# Patient Record
Sex: MALE | Race: OTHER | HISPANIC OR LATINO | URBAN - METROPOLITAN AREA
[De-identification: names, ages, dates, MRNs, and addresses within clinical notes are randomized per-mention and may not be internally consistent; named-entity substitution may affect disease eponyms.]

---

## 2022-03-05 ENCOUNTER — EMERGENCY (EMERGENCY)
Facility: HOSPITAL | Age: 52
LOS: 1 days | Discharge: HOME | End: 2022-03-05
Attending: EMERGENCY MEDICINE
Payer: COMMERCIAL

## 2022-03-05 ENCOUNTER — TRANSCRIPTION ENCOUNTER (OUTPATIENT)
Age: 52
End: 2022-03-05

## 2022-03-05 VITALS — HEIGHT: 70 IN | WEIGHT: 220.02 LBS

## 2022-03-05 DIAGNOSIS — R07.9 CHEST PAIN, UNSPECIFIED: ICD-10-CM

## 2022-03-05 DIAGNOSIS — E78.5 HYPERLIPIDEMIA, UNSPECIFIED: ICD-10-CM

## 2022-03-05 LAB
ALBUMIN SERPL ELPH-MCNC: 4.2 G/DL — SIGNIFICANT CHANGE UP (ref 3.3–5.2)
ALP SERPL-CCNC: 49 U/L — SIGNIFICANT CHANGE UP (ref 40–120)
ALT FLD-CCNC: 31 U/L — SIGNIFICANT CHANGE UP
ANION GAP SERPL CALC-SCNC: 10 MMOL/L — SIGNIFICANT CHANGE UP (ref 5–17)
APTT BLD: 30.1 SEC — SIGNIFICANT CHANGE UP (ref 27.5–35.5)
AST SERPL-CCNC: 22 U/L — SIGNIFICANT CHANGE UP
BASOPHILS # BLD AUTO: 0.04 K/UL — SIGNIFICANT CHANGE UP (ref 0–0.2)
BASOPHILS NFR BLD AUTO: 0.7 % — SIGNIFICANT CHANGE UP (ref 0–2)
BILIRUB SERPL-MCNC: 0.6 MG/DL — SIGNIFICANT CHANGE UP (ref 0.4–2)
BUN SERPL-MCNC: 14.8 MG/DL — SIGNIFICANT CHANGE UP (ref 8–20)
CALCIUM SERPL-MCNC: 8.9 MG/DL — SIGNIFICANT CHANGE UP (ref 8.6–10.2)
CHLORIDE SERPL-SCNC: 103 MMOL/L — SIGNIFICANT CHANGE UP (ref 98–107)
CO2 SERPL-SCNC: 26 MMOL/L — SIGNIFICANT CHANGE UP (ref 22–29)
CREAT SERPL-MCNC: 0.93 MG/DL — SIGNIFICANT CHANGE UP (ref 0.5–1.3)
D DIMER BLD IA.RAPID-MCNC: <150 NG/ML DDU — SIGNIFICANT CHANGE UP
EGFR: 99 ML/MIN/1.73M2 — SIGNIFICANT CHANGE UP
EOSINOPHIL # BLD AUTO: 0.18 K/UL — SIGNIFICANT CHANGE UP (ref 0–0.5)
EOSINOPHIL NFR BLD AUTO: 3.1 % — SIGNIFICANT CHANGE UP (ref 0–6)
GLUCOSE SERPL-MCNC: 150 MG/DL — HIGH (ref 70–99)
HCT VFR BLD CALC: 40.5 % — SIGNIFICANT CHANGE UP (ref 39–50)
HGB BLD-MCNC: 14 G/DL — SIGNIFICANT CHANGE UP (ref 13–17)
IMM GRANULOCYTES NFR BLD AUTO: 0.2 % — SIGNIFICANT CHANGE UP (ref 0–1.5)
INR BLD: 1.18 RATIO — HIGH (ref 0.88–1.16)
LYMPHOCYTES # BLD AUTO: 1.86 K/UL — SIGNIFICANT CHANGE UP (ref 1–3.3)
LYMPHOCYTES # BLD AUTO: 32.2 % — SIGNIFICANT CHANGE UP (ref 13–44)
MCHC RBC-ENTMCNC: 33.2 PG — SIGNIFICANT CHANGE UP (ref 27–34)
MCHC RBC-ENTMCNC: 34.6 GM/DL — SIGNIFICANT CHANGE UP (ref 32–36)
MCV RBC AUTO: 96 FL — SIGNIFICANT CHANGE UP (ref 80–100)
MONOCYTES # BLD AUTO: 0.33 K/UL — SIGNIFICANT CHANGE UP (ref 0–0.9)
MONOCYTES NFR BLD AUTO: 5.7 % — SIGNIFICANT CHANGE UP (ref 2–14)
NEUTROPHILS # BLD AUTO: 3.36 K/UL — SIGNIFICANT CHANGE UP (ref 1.8–7.4)
NEUTROPHILS NFR BLD AUTO: 58.1 % — SIGNIFICANT CHANGE UP (ref 43–77)
PLATELET # BLD AUTO: 184 K/UL — SIGNIFICANT CHANGE UP (ref 150–400)
POTASSIUM SERPL-MCNC: 3.8 MMOL/L — SIGNIFICANT CHANGE UP (ref 3.5–5.3)
POTASSIUM SERPL-SCNC: 3.8 MMOL/L — SIGNIFICANT CHANGE UP (ref 3.5–5.3)
PROT SERPL-MCNC: 6.6 G/DL — SIGNIFICANT CHANGE UP (ref 6.6–8.7)
PROTHROM AB SERPL-ACNC: 13.7 SEC — HIGH (ref 10.5–13.4)
RBC # BLD: 4.22 M/UL — SIGNIFICANT CHANGE UP (ref 4.2–5.8)
RBC # FLD: 11.5 % — SIGNIFICANT CHANGE UP (ref 10.3–14.5)
SODIUM SERPL-SCNC: 139 MMOL/L — SIGNIFICANT CHANGE UP (ref 135–145)
TROPONIN T SERPL-MCNC: <0.01 NG/ML — SIGNIFICANT CHANGE UP (ref 0–0.06)
WBC # BLD: 5.78 K/UL — SIGNIFICANT CHANGE UP (ref 3.8–10.5)
WBC # FLD AUTO: 5.78 K/UL — SIGNIFICANT CHANGE UP (ref 3.8–10.5)

## 2022-03-05 PROCEDURE — 99284 EMERGENCY DEPT VISIT MOD MDM: CPT

## 2022-03-05 PROCEDURE — 71046 X-RAY EXAM CHEST 2 VIEWS: CPT | Mod: 26

## 2022-03-05 PROCEDURE — 99220: CPT

## 2022-03-05 PROCEDURE — 93010 ELECTROCARDIOGRAM REPORT: CPT | Mod: 76

## 2022-03-05 RX ORDER — KETOROLAC TROMETHAMINE 30 MG/ML
30 SYRINGE (ML) INJECTION ONCE
Refills: 0 | Status: DISCONTINUED | OUTPATIENT
Start: 2022-03-05 | End: 2022-03-05

## 2022-03-05 RX ORDER — LOSARTAN POTASSIUM 100 MG/1
50 TABLET, FILM COATED ORAL DAILY
Refills: 0 | Status: DISCONTINUED | OUTPATIENT
Start: 2022-03-05 | End: 2022-03-10

## 2022-03-05 RX ORDER — ATORVASTATIN CALCIUM 80 MG/1
20 TABLET, FILM COATED ORAL AT BEDTIME
Refills: 0 | Status: DISCONTINUED | OUTPATIENT
Start: 2022-03-05 | End: 2022-03-10

## 2022-03-05 RX ORDER — ASPIRIN/CALCIUM CARB/MAGNESIUM 324 MG
81 TABLET ORAL DAILY
Refills: 0 | Status: DISCONTINUED | OUTPATIENT
Start: 2022-03-05 | End: 2022-03-10

## 2022-03-05 RX ADMIN — LOSARTAN POTASSIUM 50 MILLIGRAM(S): 100 TABLET, FILM COATED ORAL at 18:56

## 2022-03-05 RX ADMIN — Medication 30 MILLIGRAM(S): at 18:26

## 2022-03-05 RX ADMIN — ATORVASTATIN CALCIUM 20 MILLIGRAM(S): 80 TABLET, FILM COATED ORAL at 18:26

## 2022-03-05 RX ADMIN — Medication 81 MILLIGRAM(S): at 18:26

## 2022-03-05 NOTE — ED PROVIDER NOTE - OBJECTIVE STATEMENT
52yo male with PMH hyperlipidemia, HTN presenting with 2 days of chest pain. patient states that he has left-sided chest pain radiating to back that feels like pressure. Worse with taking a deep breath. Non-exertional/positional. no fevers/chills/cough. No nausea/vomiting. Patient recently took long car ride to [facility], denies leg swelling/. +FH myocardial infarction in brother at young age- however states brother was morbidly obese and had DM at the time.

## 2022-03-05 NOTE — ED CDU PROVIDER INITIAL DAY NOTE - ATTENDING CONTRIBUTION TO CARE
I agree with the PA's note and was available for any issues/concerns. I was directly involved in patient care. My brief overall assessment is as follows:    51 year old male PMHx HTN c/o chest pain; initial work up reviewed; admit to obs for further cardiac work up

## 2022-03-05 NOTE — ED CDU PROVIDER INITIAL DAY NOTE - OBJECTIVE STATEMENT
52 y/o M with hx of high cholesterol and high blood pressure c/o left sided chest pain radiating to back, described as stabbing, x 2 days, constant, 8/10, associated with shortness of breath.  Patient denies fever, cough, nausea/vomiting.

## 2022-03-05 NOTE — ED PROVIDER NOTE - PHYSICAL EXAMINATION
Gen: NAD, AOx3  Head: NCAT  HEENT: PERRL, oral mucosa moist, normal conjunctiva, oropharynx clear without exudate or erythema  Lung: CTAB, no respiratory distress, no wheezing, rales, rhonchi  CV: no reproducible chest wall tenderness, normal s1/s2, rrr, no murmurs, Normal perfusion, pulses 2+ throughout  Abd: soft, NTND  MSK: No edema, no visible deformities, full range of motion in all 4 extremities  Neuro: No focal neurologic deficits  Skin: No rash   Psych: normal affect

## 2022-03-05 NOTE — ED ADULT NURSE NOTE - OBJECTIVE STATEMENT
Pt presents to ED c/o chest pain. Pt reports pain radiates to his back and is sharp in nature and reports tightness in his chest when he takes a deep breath. Pt denies any cardiac PMH but states that his brother had a cardiac episode at 34. Pt placed on cardiac monitor. SPO2 99% on room air. Respirations are even and unlabored. A/O x3. Pt educated on plan of care and expresses understanding.

## 2022-03-06 PROCEDURE — 99213 OFFICE O/P EST LOW 20 MIN: CPT

## 2022-03-06 PROCEDURE — 99226: CPT

## 2022-03-06 PROCEDURE — 93010 ELECTROCARDIOGRAM REPORT: CPT

## 2022-03-06 PROCEDURE — 93306 TTE W/DOPPLER COMPLETE: CPT | Mod: 26

## 2022-03-06 RX ADMIN — ATORVASTATIN CALCIUM 20 MILLIGRAM(S): 80 TABLET, FILM COATED ORAL at 21:25

## 2022-03-06 RX ADMIN — Medication 81 MILLIGRAM(S): at 11:15

## 2022-03-06 NOTE — ED CDU PROVIDER SUBSEQUENT DAY NOTE - PROGRESS NOTE DETAILS
TTE fairly unremarkable, showing moderate asymmetric LVH. spoke with Dr. Mckeon, pt to undergo NST tomorrow morning.

## 2022-03-07 VITALS
DIASTOLIC BLOOD PRESSURE: 94 MMHG | SYSTOLIC BLOOD PRESSURE: 138 MMHG | RESPIRATION RATE: 16 BRPM | HEART RATE: 80 BPM | TEMPERATURE: 98 F | OXYGEN SATURATION: 96 %

## 2022-03-07 DIAGNOSIS — I10 ESSENTIAL (PRIMARY) HYPERTENSION: ICD-10-CM

## 2022-03-07 PROCEDURE — 96374 THER/PROPH/DIAG INJ IV PUSH: CPT | Mod: XU

## 2022-03-07 PROCEDURE — 93018 CV STRESS TEST I&R ONLY: CPT

## 2022-03-07 PROCEDURE — 78452 HT MUSCLE IMAGE SPECT MULT: CPT

## 2022-03-07 PROCEDURE — 36415 COLL VENOUS BLD VENIPUNCTURE: CPT

## 2022-03-07 PROCEDURE — 85730 THROMBOPLASTIN TIME PARTIAL: CPT

## 2022-03-07 PROCEDURE — G0378: CPT

## 2022-03-07 PROCEDURE — 85025 COMPLETE CBC W/AUTO DIFF WBC: CPT

## 2022-03-07 PROCEDURE — 93306 TTE W/DOPPLER COMPLETE: CPT

## 2022-03-07 PROCEDURE — 84484 ASSAY OF TROPONIN QUANT: CPT

## 2022-03-07 PROCEDURE — 71046 X-RAY EXAM CHEST 2 VIEWS: CPT

## 2022-03-07 PROCEDURE — 99285 EMERGENCY DEPT VISIT HI MDM: CPT | Mod: 25

## 2022-03-07 PROCEDURE — 93016 CV STRESS TEST SUPVJ ONLY: CPT

## 2022-03-07 PROCEDURE — 99217: CPT

## 2022-03-07 PROCEDURE — 80053 COMPREHEN METABOLIC PANEL: CPT

## 2022-03-07 PROCEDURE — 78452 HT MUSCLE IMAGE SPECT MULT: CPT | Mod: 26

## 2022-03-07 PROCEDURE — A9500: CPT

## 2022-03-07 PROCEDURE — 93017 CV STRESS TEST TRACING ONLY: CPT

## 2022-03-07 PROCEDURE — 85379 FIBRIN DEGRADATION QUANT: CPT

## 2022-03-07 PROCEDURE — 93005 ELECTROCARDIOGRAM TRACING: CPT

## 2022-03-07 PROCEDURE — 85610 PROTHROMBIN TIME: CPT

## 2022-03-07 RX ADMIN — LOSARTAN POTASSIUM 50 MILLIGRAM(S): 100 TABLET, FILM COATED ORAL at 06:12

## 2022-03-07 NOTE — PROGRESS NOTE ADULT - NSPROGADDITIONALINFOA_GEN_ALL_CORE
Addendum  Stress test non ischemic- ok for discharge from cardiology perspective, follow up in office in 3 weeks

## 2022-03-07 NOTE — ED ADULT NURSE REASSESSMENT NOTE - GENERAL PATIENT STATE
comfortable appearance/cooperative
comfortable appearance/cooperative
comfortable appearance
comfortable appearance
comfortable appearance/cooperative

## 2022-03-07 NOTE — PROGRESS NOTE ADULT - SUBJECTIVE AND OBJECTIVE BOX
[facility]                                                         CARDIOLOGY AT [facility]                                                                  [address], [place] [address]                                                         Telephone: [phone]. Fax:[phone]                                                                             PROGRESS NOTE    Reason for follow up: chest pain  Update: Denies chest pain, shortness of breath  c/o headache this am  Plan for NST today. TTE EF 65-70%, no RWMA      Review of symptoms:   Cardiac:  No chest pain. No dyspnea. No palpitations.  Respiratory: no cough. No dyspnea  Gastrointestinal: No diarrhea. No abdominal pain. No bleeding.   Neuro: No focal neuro complaints.    Vitals:  T(C): 36.6 (03-07-22 @ 08:03), Max: 36.7 (03-06-22 @ 12:02)  HR: 77 (03-07-22 @ 08:03) (69 - 79)  BP: 141/94 (03-07-22 @ 08:03) (105/64 - 141/94)  RR: 16 (03-07-22 @ 08:03) (16 - 18)  SpO2: 96% (03-07-22 @ 08:03) (94% - 96%)      Weight (kg): 99.8 (03-05 @ 14:55)      PHYSICAL EXAM:  Appearance: Comfortable. No acute distress  HEENT:  Atraumatic. Normocephalic.  Normal oral mucosa  Neurologic: A & O x 3, no gross focal deficits.  Cardiovascular: RRR S1 S2, No murmur, no rubs/gallops. No JVD  Respiratory: Lungs clear to auscultation, unlabored   Gastrointestinal:  Soft, Non-tender, + BS  Lower Extremities: 2+ Peripheral Pulses, No clubbing, cyanosis, or edema  Psychiatry: Patient is calm. No agitation.   Skin: warm and dry.      CURRENT CARDIAC MEDICATIONS:  losartan 50 milliGRAM(s) Oral daily      CURRENT OTHER MEDICATIONS:  atorvastatin 20 milliGRAM(s) Oral at bedtime  aspirin  chewable 81 milliGRAM(s) Oral daily      LABS:	 	  ( 05 Mar 2022 21:42 )  Troponin T  <0.01,  CPK  X    , CKMB  X    , BNP X      ( 05 Mar 2022 18:50 )  Troponin T  <0.01,  CPK  X    , CKMB  X    , BNP X      ( 05 Mar 2022 16:04 )  Troponin T  <0.01,  CPK  X    , CKMB  X    , BNP X                                  14.0   5.78  )-----------( 184      ( 05 Mar 2022 16:04 )             40.5     03-05    139  |  103  |  14.8  ----------------------------<  150<H>  3.8   |  26.0  |  0.93    Ca    8.9      05 Mar 2022 16:04    TPro  6.6  /  Alb  4.2  /  TBili  0.6  /  DBili  x   /  AST  22  /  ALT  31  /  AlkPhos  49  03-05    PT/INR/PTT ( 05 Mar 2022 16:04 )                       :                       :      13.7         :       30.1                  .        .                   .              .           .       1.18        .                                         TELEMETRY: SR/SB, no events     DIAGNOSTIC TESTING:  [ ] Echocardiogram:   < from: TTE Echo Complete w/o Contrast w/ Doppler (03.06.22 @ 12:38) >  Summary:   1. Technically fair study.   2. Normal global left ventricular systolic function.   3. Left ventricular ejection fraction, by visual estimation, is 65 to 70%.   4. LV Ejection Fraction by Huston's Method with a biplane EF of 70 %.   5. There is moderate asymmetric left ventricular hypertrophy.   6. Mildly enlarged right ventricle.   7. Normal right atrial size.   8. Mild mitral annular calcification.   9. Mild pulmonic valve regurgitation.  10. Trivial pericardial effusion.  11. Trace mitral valve regurgitation.    MD Tana Electronically signed on 3/6/2022 at 5:13:17 PM    < end of copied text >          
                                                         [facility]                                                         CARDIOLOGY AT [facility]                                                                  [address], [facility] [place] [address]                                                         Telephone: [phone]. Fax:[phone]                                                                             PROGRESS NOTE    Reason for follow up: echo showing   Update: needs stress test       Review of symptoms:   Cardiac:  No chest pain. No dyspnea. No palpitations.  Respiratory: no cough. No dyspnea  Gastrointestinal: No diarrhea. No abdominal pain. No bleeding.   Neuro: No focal neuro complaints.    Vitals:  T(C): 36.7 (03-06-22 @ 15:14), Max: 36.7 (03-06-22 @ 07:25)  HR: 79 (03-06-22 @ 15:14) (56 - 79)  BP: 105/64 (03-06-22 @ 15:14) (105/64 - 133/81)  RR: 18 (03-06-22 @ 15:14) (18 - 18)  SpO2: 96% (03-06-22 @ 15:14) (94% - 97%)  Wt(kg): --  I&O's Summary    Weight (kg): 99.8 (03-05 @ 14:55)    PHYSICAL EXAM:  Appearance: Comfortable. No acute distress  HEENT:  Atraumatic. Normocephalic.  Normal oral mucosa  Neurologic: A & O x 3, no gross focal deficits.  Cardiovascular: RRR S1 S2, No murmur, no rubs/gallops. No JVD  Respiratory: Lungs clear to auscultation, unlabored   Gastrointestinal:  Soft, Non-tender, + BS  Lower Extremities: 2+ Peripheral Pulses, No clubbing, cyanosis, or edema  Psychiatry: Patient is calm. No agitation.   Skin: warm and dry.    CURRENT CARDIAC MEDICATIONS:  losartan 50 milliGRAM(s) Oral daily      CURRENT OTHER MEDICATIONS:  atorvastatin 20 milliGRAM(s) Oral at bedtime  aspirin  chewable 81 milliGRAM(s) Oral daily      LABS:	 	  ( 05 Mar 2022 21:42 )  Troponin T  <0.01,  CPK  X    , CKMB  X    , BNP X        , ( 05 Mar 2022 18:50 )  Troponin T  <0.01,  CPK  X    , CKMB  X    , BNP X        , ( 05 Mar 2022 16:04 )  Troponin T  <0.01,  CPK  X    , CKMB  X    , BNP X                                  14.0   5.78  )-----------( 184      ( 05 Mar 2022 16:04 )             40.5     03-05    139  |  103  |  14.8  ----------------------------<  150<H>  3.8   |  26.0  |  0.93    Ca    8.9      05 Mar 2022 16:04    TPro  6.6  /  Alb  4.2  /  TBili  0.6  /  DBili  x   /  AST  22  /  ALT  31  /  AlkPhos  49  03-05    PT/INR/PTT ( 05 Mar 2022 16:04 )                       :                       :      13.7         :       30.1                  .        .                   .              .           .       1.18        .                                       Lipid Profile:   HgA1c:   TSH:     TELEMETRY:   ECG:    DIAGNOSTIC TESTING:  [ ] Echocardiogram:   < from: TTE Echo Complete w/o Contrast w/ Doppler (03.06.22 @ 12:38) >  Summary:   1. Technically fair study.   2. Normal global left ventricular systolic function.   3. Left ventricular ejection fraction, by visual estimation, is 65 to   70%.   4. LV Ejection Fraction by Huston's Method with a biplane EF of 70 %.   5. There is moderate asymmetric left ventricular hypertrophy.   6. Mildly enlarged right ventricle.   7. Normal right atrial size.   8. Mild mitral annular calcification.   9. Mild pulmonic valve regurgitation.  10. Trivial pericardial effusion.  11. Trace mitral valve regurgitation.    < end of copied text >    [ ]  Catheterization:  [ ] Stress Test:    OTHER: 	      
                                             [facility]                                              CARDIOLOGY AT [place] [address]                                             Telephone: [phone]. Fax:[phone]                                                         CARDIOLOGY CONSULTATION NOTE                                                                                             Consult requested by:  Dr. Ortiz  History obtained by: Patient and medical record  Community Cardiologist:    obtained: Yes [  ] No [ x ]  Reason for Consultation:  chest discomfort  Availably out pt records reviewed: Yes [  ] No [x  ]    Chief complaint:    Patient is a 51y old male overweight with HTN and HLD presents to the ER after being referred from out patient clinic with chest discomfort  Pain has a pleuritic component to it.  Located in left chest region. Not reproducible  Has been having the pain for a few days but today it was worse and he had his wife leave work to come home to bring him to clinic.   He had lifted some heavy equipment with brother over one week ago but did not immediately complain of the pain after this event  Denies any sob, palpitations or syncope  Has a brother dxed with early CAD but he is very overweight and is a smoker  Risk factors for CAD ->  HTN, HLD, family hx    PAST MEDICAL HISTORY  HTN  HLD    PAST SURGICAL HISTORY  Negative    SOCIAL HISTORY:    CIGARETTES:   NO  ALCOHOL:  social  DRUGS:  "weed"      Family History of Cardiovascular Disease:  Yes [ x ] No [  ]  Coronary Artery Disease in first degree relative: Yes [  x] No [  ]  Sudden Cardiac Death in First degree relative: Yes [  ] No [  x]      HOME MEDICATIONS:  Crestor  omesartan    ALLERGIES: Iodine  No Known Drug Allergies  Shrimp (Unknown) -> anaphylaxsis      REVIEW OF SYMPTOMS:   CONSTITUTIONAL: No fever, no chills, no weight loss, no weight gain, no fatigue   ENMT:  No vertigo; No sinus or throat pain  NECK: No pain or stiffness  CARDIOVASCULAR: see hpi  RESPIRATORY: no Shortness of breath, no cough, no wheezing  : No dysuria, no hematuria   GI: No dark color stool, no nausea, no diarrhea, no constipation, no abdominal pain   NEURO: No headache, no slurred speech   MUSCULOSKELETAL: No joint pain or swelling; No muscle, back, or extremity pain  PSYCH: No agitation, no anxiety.    ALL OTHER REVIEW OF SYSTEMS ARE NEGATIVE.      Vital Signs Last 24 Hrs  T(C): 37.2 (05 Mar 2022 15:01), Max: 37.2 (05 Mar 2022 15:01)  T(F): 98.9 (05 Mar 2022 15:01), Max: 98.9 (05 Mar 2022 15:01)  HR: 75 (05 Mar 2022 15:01) (75 - 75)  BP: 129/78 (05 Mar 2022 15:01) (129/78 - 129/78)  BP(mean): --  RR: 18 (05 Mar 2022 15:01) (18 - 18)  SpO2: 99% (05 Mar 2022 15:01) (99% - 99%)  INTAKE AND OUTPUT:     PHYSICAL EXAM:  Constitutional: Comfortable . No acute distress.   HEENT: Atraumatic and normocephalic , neck is supple . no JVD. No carotid bruit.  CNS: A&Ox3. No focal deficits.   Respiratory: CTAB, unlabored   Cardiovascular: RRR normal s1 s2. No murmur. No rubs or gallop.  No reproducible pain  Gastrointestinal: Soft, non-tender. +Bowel sounds.   MSK: full ROM extremities x 4  Extremities: No edema. No cyanosis   Psychiatric: Calm . no agitation.   Skin: Warm and dry, no ulcers on extremities       LABS:                        14.0   5.78  )-----------( 184      ( 05 Mar 2022 16:04 )             40.5     03-05    139  |  103  |  14.8  ----------------------------<  150<H>  3.8   |  26.0  |  0.93    Ca    8.9      05 Mar 2022 16:04    TPro  6.6  /  Alb  4.2  /  TBili  0.6  /  DBili  x   /  AST  22  /  ALT  31  /  AlkPhos  49  03-05    CARDIAC MARKERS ( 05 Mar 2022 16:04 )  x     / <0.01 ng/mL / x     / x     / x        ;p-BNP=  PT/INR - ( 05 Mar 2022 16:04 )   PT: 13.7 sec;   INR: 1.18 ratio      PTT - ( 05 Mar 2022 16:04 )  PTT:30.1 sec    INTERPRETATION OF TELEMETRY:   ECG: NSR poor rwave progression v2-v3 otherwise normal  Prior ECG: Yes [  ] No [  x]

## 2022-03-07 NOTE — ED CDU PROVIDER DISPOSITION NOTE - CARE PROVIDERS DIRECT ADDRESSES
,[email or web address]
[email or web address]

## 2022-03-07 NOTE — ED CDU PROVIDER DISPOSITION NOTE - ATTENDING CONTRIBUTION TO CARE
placed on obs for extended eval of chest pain. ECG unremarkable. Troponin x3 negative.  Nuclear stress negative.  Seen and cleared by cardiology for continuous work-up in outpatient setting

## 2022-03-07 NOTE — PROGRESS NOTE ADULT - PROBLEM SELECTOR PLAN 1
with multiple risk factors  would trend trop  ekg in the am  Echo in am  will decide if stress test can be done in vs out patient pending echo results  will give a dose of toradol and see if he gets relief of the pain
with multiple risk factors  echo with preserved EF 65-70%  with family history (brother, CAD/MI same age), HTN, HLD, obesity, current heavy smoker  patient needs risk stratification with stress test as pt had anginal type chest pain   stress in AM   NPO @ MN   Diet/lifestyle modifications and medication compliance heavily reinforced  continue ASA, lipitor, losartan  not on BB 2/2 to bradycardia, re-eval in AM
Troponin neg   NST today  if NST normal ok for discharge home, follow up in office  cont asa

## 2022-03-07 NOTE — ED CDU PROVIDER DISPOSITION NOTE - CLINICAL COURSE
51y M w/ hx HTN and HLD, presented for chest pain. Pt evaluated by cardiology; serial trops negative, TTE unremarkable, NST nonischemic. Pt stable for discharge with outpatient f/u.
52yo M pmhx HTN, HLD presented to ED c/o chest pain x 2 days. Initial ED workup unremarkable. Kept in obs overnight for serial troponin and [facility] Cardiology consult. Pt evaluated by Dr. Mckeon who recommended TTE. Troponin negative x 3. Pt remained asymptomatic overnight and during day today. TTE shows moderately asymmetric LVH, otherwise fairly unremarkable. no further cardiology intervention at this time.

## 2022-03-07 NOTE — PROGRESS NOTE ADULT - TIME-BASED
History of thoracotomy  9/24/17 Left thoracotomy, Left upper lobectomy   9/25/17 Reop left thoracotomy, evacuation of left hemothorax
26
55
25

## 2022-03-07 NOTE — ED ADULT NURSE REASSESSMENT NOTE - NS ED NURSE REASSESS COMMENT FT1
Pt remained safe and medically stable during the nightshift. Full code, AOx4, continent x2. NSR with HR in the 60's-70's on continuous cardiac monitor. Third troponin was drawn and sent to lab - resulted as negative. Echo pending (AM). No apparent signs of acute discomfort/distress at this time. Infection/fall/safety protocol maintained. Pt informed of call bell system and placed within reach. RN to continue with active orders and current plan of care.
Patient received from Day RN at 0700. Patient alert and oriented x4, maintaining airway and breathing without distress or difficulty. Patient sinus rhythm on Tele, Will be NPO after midnight for NST tomorro. Patient denied any chest pain or discomfort. Will continue to monitor for safety
Assumed care of the patient @0730. Pt A&Ox4, VSs afebrile. Pt on cardiac monitor sinus rhythm with some ST elevation, REJI Arroyo made aware. EKG obtained. Denies chest pain. Patient in understanding of plan of care. Patient with no further questions for the RN. Resting in comfort. Call bell within reach and encouraged to use when assistance needed. Will continue to monitor.
Received patient at change of shift from dayshift RN at 19:00. Full code, AOx4, continent x2,independent. NSR on continuous cardiac monitor, HR 68. Awaiting echo in AM. No apparent signs of acute discomfort/distress at this time. Infection/fall/safety protocol maintained. Pt informed of call bell system and placed within reach. RN to continue with active orders and current plan of care.
Assumed care of the patient @0730. Pt A&Ox4, VSS afebrile. Pt awaiting stress test today, NPO status maintained. Cardiac monitor in place normal sinus, denies chest pain.  Patient in understanding of plan of care. Patient with no further questions for the RN. Resting in comfort. Call bell within reach and encouraged to use when assistance needed. Will continue to monitor.

## 2022-03-07 NOTE — ED ADULT NURSE REASSESSMENT NOTE - ANCILLARY STATUS
stress test/cardiovascular tests pending
AWAITING ECHO RESULTS/cardiovascular tests pending
trops AT 2130; ECHO IN am/awaiting lab draw/awaiting radiology/respiratory orders pending
NST/cardiovascular tests pending

## 2022-03-07 NOTE — PROGRESS NOTE ADULT - TIME BILLING
Above noted.  Pt was seen and examined. Plan of care DW NP.   Pt is CP free today.   CE are negative.   BP is controlled. Not on meds at this time.  Cont with statin therapy. No events on tele.  TTE results dw pt and wife  Plan for stress test with risk factors, cp in am  Keep npo post midnight.
Above noted.   Pt seen and examined. No more CP.   Pt s/p stress test, negative for ischemia.   Cont with BP meds  Cont with statin therapy   fu with pmd  Decrease caloric intake. Weight loss dw pt.  FU with PMD in 1-2 weeks.
52 yo male seen and examined. Wife at bedside  Hx of HTN, HLD, non diabetic, fhx for CAD, brother with mi at age 40  pt with cp, radiation to upper back, no sob at rest but feels more short of breath with exertion  no syncope or presyncope.  No prior EKGs, delayed R progression in anterior leads.   Recycle CE, TTE in AM  Ischemic workup on monday.  cont with BP and statin.  I agree with a/p.

## 2022-03-07 NOTE — ED CDU PROVIDER SUBSEQUENT DAY NOTE - ATTENDING CONTRIBUTION TO CARE
seen on morning rounds.  reports no complaints at present.  Had chest pain with stabbing in back for 3 days.  assoc sob.  no prior chest pain.  improved with lying down.  H/O HTN and HLD.  Brother with MI at age 35.  PE nontoxic appearing., NAD.  Trop x 3 negative. ECG: sinus with no ominous st/t changes.  Seen by cardiology and nuclear stress ordered.
I performed a face to face history and physical exam of the patient and discussed their management with the student/resident/ACP. I reviewed the student/resident/ACP's note and agree with the documented findings and plan of care.    pt with chest pain.  pending TTE and NST.

## 2022-03-07 NOTE — ED CDU PROVIDER DISPOSITION NOTE - CARE PROVIDER_API CALL
Sam Mckeon)  Cardiovascular Disease  [address], [place] [address]  Phone: [phone]  Fax: [phone]  Follow Up Time:   
Sam Mckeon)  Cardiovascular Disease  [address], [place] [address]  Phone: [phone]  Fax: [phone]  Follow Up Time:

## 2022-03-07 NOTE — ED CDU PROVIDER SUBSEQUENT DAY NOTE - MEDICAL DECISION MAKING DETAILS
Echo in the AM, [place] cardiology following
pending NST this am part of cardiology ischemic evaluation

## 2022-03-07 NOTE — ED ADULT NURSE REASSESSMENT NOTE - COMFORT CARE
plan of care explained/po fluids offered/repositioned/side rails up/warm blanket provided
plan of care explained/po fluids offered/warm blanket provided
darkened lights/plan of care explained/warm blanket provided
assisted to bathroom
plan of care explained/wait time explained

## 2022-03-07 NOTE — ED CDU PROVIDER DISPOSITION NOTE - NSFOLLOWUPINSTRUCTIONS_ED_ALL_ED_FT
- Follow-up with cardiology in 3 weeks, as well as your primary care physician.  - Continue with your home medications as prescribed.  - Return to the emergency room for any new or worsening issues.    =============================    Nonspecific Chest Pain, Adult      Chest pain can be caused by many different conditions. It can be caused by a condition that is life-threatening and requires treatment right away. It can also be caused by something that is not life-threatening. If you have chest pain, it can be hard to know the difference, so it is important to get help right away to make sure that you do not have a serious condition.    Some life-threatening causes of chest pain include:  •Heart attack.      •A tear in the body's main blood vessel (aortic dissection).      •Inflammation around your heart (pericarditis).      •A problem in the lungs, such as a blood clot (pulmonary embolism) or a collapsed lung (pneumothorax).      Some non life-threatening causes of chest pain include:  •Heartburn.      •Anxiety or stress.      •Damage to the bones, muscles, and cartilage that make up your chest wall.      •Pneumonia or bronchitis.      •Shingles infection (varicella-zoster virus).      Chest pain can feel like:  •Pain or discomfort on the surface of your chest or deep in your chest.      •Crushing, pressure, aching, or squeezing pain.      •Burning or tingling.      •Dull or sharp pain that is worse when you move, cough, or take a deep breath.      •Pain or discomfort that is also felt in your back, neck, jaw, shoulder, or arm, or pain that spreads to any of these areas.      Your chest pain may come and go. It may also be constant. Your health care provider will do lab tests and other studies to find the cause of your pain. Treatment will depend on the cause of your chest pain.      Follow these instructions at home:    Medicines     •Take over-the-counter and prescription medicines only as told by your health care provider.      •If you were prescribed an antibiotic, take it as told by your health care provider. Do not stop taking the antibiotic even if you start to feel better.      Lifestyle      •Rest as directed by your health care provider.      • Do not use any products that contain nicotine or tobacco, such as cigarettes and e-cigarettes. If you need help quitting, ask your health care provider.      • Do not drink alcohol.    •Make healthy lifestyle choices as recommended. These may include:  •Getting regular exercise. Ask your health care provider to suggest some activities that are safe for you.      •Eating a heart-healthy diet. This includes plenty of fresh fruits and vegetables, whole grains, low-fat (lean) protein, and low-fat dairy products. A dietitian can help you find healthy eating options.      •Maintaining a healthy weight.      •Managing any other health conditions you have, such as high blood pressure (hypertension) or diabetes.      •Reducing stress, such as with yoga or relaxation techniques.      General instructions     •Pay attention to any changes in your symptoms. Tell your health care provider about them or any new symptoms.      •Avoid any activities that cause chest pain.      •Keep all follow-up visits as told by your health care provider. This is important. This includes visits for any further testing if your chest pain does not go away.        Contact a health care provider if:    •Your chest pain does not go away.      •You feel depressed.      •You have a fever.        Get help right away if:    •Your chest pain gets worse.      •You have a cough that gets worse, or you cough up blood.      •You have severe pain in your abdomen.      •You faint.      •You have sudden, unexplained chest discomfort.      •You have sudden, unexplained discomfort in your arms, back, neck, or jaw.      •You have shortness of breath at any time.      •You suddenly start to sweat, or your skin gets clammy.      •You feel nausea or you vomit.      •You suddenly feel lightheaded or dizzy.      •You have severe weakness, or unexplained weakness or fatigue.      •Your heart begins to beat quickly, or it feels like it is skipping beats.      These symptoms may represent a serious problem that is an emergency. Do not wait to see if the symptoms will go away. Get medical help right away. Call your local emergency services (911 in the [place] Do not drive yourself to the hospital.       Summary    •Chest pain can be caused by a condition that is serious and requires urgent treatment. It may also be caused by something that is not life-threatening.      •If you have chest pain, it is very important to see your health care provider. Your health care provider may do lab tests and other studies to find the cause of your pain.      •Follow your health care provider's instructions on taking medicines, making lifestyle changes, and getting emergency treatment if symptoms become worse.      •Keep all follow-up visits as told by your health care provider. This includes visits for any further testing if your chest pain does not go away.      This information is not intended to replace advice given to you by your health care provider. Make sure you discuss any questions you have with your health care provider.
- Follow up with your doctor within 2-3 days.   - Return to the ED for any new or worsening symptoms.   - Follow-up with Cardiologist within 2-4 weeks    Chest Pain    Chest pain can be caused by many different conditions which may or may not be dangerous. Causes include heartburn, lung infections, heart attack, blood clot in lungs, skin infections, strain or damage to muscle, cartilage, or bones, etc. In addition to a history and physical examination, an electrocardiogram (ECG) or other lab tests may have been performed to determine the cause of your chest pain. Follow up with your primary care provider or with a cardiologist as instructed.     SEEK IMMEDIATE MEDICAL CARE IF YOU HAVE ANY OF THE FOLLOWING SYMPTOMS: worsening chest pain, coughing up blood, unexplained back/neck/jaw pain, severe abdominal pain, dizziness or lightheadedness, fainting, shortness of breath, sweaty or clammy skin, vomiting, or racing heart beat. These symptoms may represent a serious problem that is an emergency. Do not wait to see if the symptoms will go away. Get medical help right away. Call 911 and do not drive yourself to the hospital.

## 2022-03-07 NOTE — ED CDU PROVIDER DISPOSITION NOTE - PATIENT PORTAL LINK FT
You can access the FollowMyHealth Patient Portal offered by [facility] by registering at the following website: [email or web address]. By joining [facility]’s FollowMyHealth portal, you will also be able to view your health information using other applications (apps) compatible with our system.
You can access the FollowMyHealth Patient Portal offered by [facility] by registering at the following website: [email or web address]. By joining [facility]’s FollowMyHealth portal, you will also be able to view your health information using other applications (apps) compatible with our system.

## 2022-03-24 ENCOUNTER — APPOINTMENT (OUTPATIENT)
Dept: CARDIOLOGY | Facility: CLINIC | Age: 52
End: 2022-03-24
Payer: COMMERCIAL

## 2022-03-24 ENCOUNTER — NON-APPOINTMENT (OUTPATIENT)
Age: 52
End: 2022-03-24

## 2022-03-24 VITALS
HEIGHT: 70 IN | OXYGEN SATURATION: 95 % | RESPIRATION RATE: 14 BRPM | BODY MASS INDEX: 33.9 KG/M2 | HEART RATE: 77 BPM | TEMPERATURE: 97.9 F | SYSTOLIC BLOOD PRESSURE: 128 MMHG | DIASTOLIC BLOOD PRESSURE: 80 MMHG | WEIGHT: 236.8 LBS

## 2022-03-24 VITALS — DIASTOLIC BLOOD PRESSURE: 78 MMHG | SYSTOLIC BLOOD PRESSURE: 126 MMHG

## 2022-03-24 DIAGNOSIS — Z78.9 OTHER SPECIFIED HEALTH STATUS: ICD-10-CM

## 2022-03-24 DIAGNOSIS — Z82.49 FAMILY HISTORY OF ISCHEMIC HEART DISEASE AND OTHER DISEASES OF THE CIRCULATORY SYSTEM: ICD-10-CM

## 2022-03-24 DIAGNOSIS — R07.9 CHEST PAIN, UNSPECIFIED: ICD-10-CM

## 2022-03-24 DIAGNOSIS — R06.83 SNORING: ICD-10-CM

## 2022-03-24 PROCEDURE — 99072 ADDL SUPL MATRL&STAF TM PHE: CPT

## 2022-03-24 PROCEDURE — 99214 OFFICE O/P EST MOD 30 MIN: CPT

## 2022-03-24 PROCEDURE — 93000 ELECTROCARDIOGRAM COMPLETE: CPT

## 2022-03-24 RX ORDER — OLMESARTAN MEDOXOMIL 20 MG/1
20 TABLET, FILM COATED ORAL
Qty: 90 | Refills: 1 | Status: ACTIVE | COMMUNITY
Start: 2022-03-24

## 2022-03-24 RX ORDER — ROSUVASTATIN CALCIUM 5 MG/1
5 TABLET, FILM COATED ORAL DAILY
Refills: 0 | Status: ACTIVE | COMMUNITY

## 2022-03-24 RX ORDER — ALFUZOSIN HYDROCHLORIDE 10 MG/1
10 TABLET, EXTENDED RELEASE ORAL DAILY
Refills: 0 | Status: ACTIVE | COMMUNITY

## 2022-03-25 RX ORDER — ASPIRIN 81 MG/1
81 TABLET ORAL
Refills: 0 | Status: DISCONTINUED | COMMUNITY
Start: 2022-03-24 | End: 2022-03-25

## 2022-08-05 ENCOUNTER — APPOINTMENT (OUTPATIENT)
Dept: GASTROENTEROLOGY | Facility: CLINIC | Age: 52
End: 2022-08-05

## 2022-08-05 VITALS
WEIGHT: 234 LBS | RESPIRATION RATE: 16 BRPM | TEMPERATURE: 98.7 F | BODY MASS INDEX: 33.5 KG/M2 | DIASTOLIC BLOOD PRESSURE: 82 MMHG | SYSTOLIC BLOOD PRESSURE: 136 MMHG | OXYGEN SATURATION: 98 % | HEIGHT: 70 IN | HEART RATE: 74 BPM

## 2022-08-05 DIAGNOSIS — K21.9 GASTRO-ESOPHAGEAL REFLUX DISEASE W/OUT ESOPHAGITIS: ICD-10-CM

## 2022-08-05 DIAGNOSIS — E78.5 HYPERLIPIDEMIA, UNSPECIFIED: ICD-10-CM

## 2022-08-05 DIAGNOSIS — I10 ESSENTIAL (PRIMARY) HYPERTENSION: ICD-10-CM

## 2022-08-05 PROCEDURE — 99204 OFFICE O/P NEW MOD 45 MIN: CPT

## 2022-08-05 RX ORDER — POLYETHYLENE GLYCOL-3350, SODIUM CHLORIDE, POTASSIUM CHLORIDE AND SODIUM BICARBONATE 420; 11.2; 5.72; 1.48 G/438.4G; G/438.4G; G/438.4G; G/438.4G
420 POWDER, FOR SOLUTION ORAL
Qty: 4000 | Refills: 0 | Status: ACTIVE | COMMUNITY
Start: 2022-08-05 | End: 1900-01-01

## 2022-08-05 RX ORDER — ERGOCALCIFEROL 1.25 MG/1
1.25 MG CAPSULE, LIQUID FILLED ORAL
Qty: 4 | Refills: 0 | Status: ACTIVE | COMMUNITY
Start: 2022-06-22

## 2022-08-05 RX ORDER — TAMSULOSIN HYDROCHLORIDE 0.4 MG/1
0.4 CAPSULE ORAL
Qty: 10 | Refills: 0 | Status: ACTIVE | COMMUNITY
Start: 2022-06-22

## 2022-08-05 RX ORDER — COVID-19 ANTIGEN TEST
KIT MISCELLANEOUS
Qty: 2 | Refills: 0 | Status: ACTIVE | COMMUNITY
Start: 2022-03-12

## 2022-08-05 NOTE — HISTORY OF PRESENT ILLNESS
[de-identified] : In March of this year the patient was doing some heavy lifting and developed a sudden onset of rather diffuse chest pain described as a tightness which was worse on deep inspiration.  He was seen in the emergency department and underwent a subsequent cardiac evaluation which was unremarkable.  The pain resolved within 48 hours and has never reoccurred.  He does note that if he has any alcohol he is prone to experience heartburn and on occasion he may wake from sleep short of breath possibly due to nocturnal reflux.  If he avoids alcohol he is asymptomatic.  There is no dysphagia or odynophagia.  He denies any nausea or vomiting.  There is no other abdominal pain.  There is no diarrhea, constipation, rectal bleeding or melena.  He has never undergone a colonoscopy.

## 2022-08-05 NOTE — ASSESSMENT
[FreeTextEntry1] : The patient has never undergone a screening colonoscopy and he will be scheduled for this examination.  The chest pain that occurred for the 1 week and in March was clearly musculoskeletal due to heavy lifting.  He does have reflux but it only occurs with the ingestion of alcohol which she should try to avoid is much as possible and otherwise he is asymptomatic and therefore I do not think an upper endoscopy is necessary.  He will obtain a CBC, basic metabolic profile and prothrombin time prior. The risks, benefits, complications and possible adverse consequences associated with colonoscopy were discussed with the patient.\par

## 2022-08-05 NOTE — REASON FOR VISIT
Problem: Patient Care Overview  Goal: Plan of Care Review  Outcome: Ongoing (interventions implemented as appropriate)  Flowsheets (Taken 8/17/2020 6116)  Plan of Care Reviewed With: patient  Note:   SLP evaluation completed. Will address dysphagia with MBS today. Will address dysarthria in tx. Please see note for further details and recommendations.          [Consultation] : a consultation visit [FreeTextEntry1] : atypical chest pain and nocturnal gagging as well as colon cancer screening

## 2022-08-05 NOTE — PHYSICAL EXAM
[General Appearance - Alert] : alert [General Appearance - In No Acute Distress] : in no acute distress [General Appearance - Well Nourished] : well nourished [General Appearance - Well Developed] : well developed [General Appearance - Well-Appearing] : healthy appearing [Sclera] : the sclera and conjunctiva were normal [PERRL With Normal Accommodation] : pupils were equal in size, round, and reactive to light [Extraocular Movements] : extraocular movements were intact [Outer Ear] : the ears and nose were normal in appearance [Hearing Threshold Finger Rub Not [place]] : hearing was normal [Examination Of The Oral Cavity] : the lips and gums were normal [Neck Appearance] : the appearance of the neck was normal [Auscultation Breath Sounds / Voice Sounds] : lungs were clear to auscultation bilaterally [Heart Rate And Rhythm] : heart rate was normal and rhythm regular [Heart Sounds] : normal S1 and S2 [Heart Sounds Gallop] : no gallops [Murmurs] : no murmurs [Heart Sounds Pericardial Friction Rub] : no pericardial rub [Bowel Sounds] : normal bowel sounds [Abdomen Soft] : soft [Abdomen Tenderness] : non-tender [Abdomen Mass (___ Cm)] : no abdominal mass palpated [Abnormal Walk] : normal gait [Nail Clubbing] : no clubbing  or cyanosis of the fingernails [Musculoskeletal - Swelling] : no joint swelling seen [Motor Tone] : muscle strength and tone were normal [Skin Color & Pigmentation] : normal skin color and pigmentation [Skin Turgor] : normal skin turgor [] : no rash [Motor Exam] : the motor exam was normal [No Focal Deficits] : no focal deficits [Oriented To Time, Place, And Person] : oriented to person, place, and time [Impaired Insight] : insight and judgment were intact [Affect] : the affect was normal [FreeTextEntry1] : Overweight

## 2022-09-07 NOTE — ED CDU PROVIDER SUBSEQUENT DAY NOTE - PHYSICAL EXAMINATION
Gen: NAD, AOx3  Head: NCAT  HEENT: PERRL, oral mucosa moist, normal conjunctiva, oropharynx clear without exudate or erythema  Lung: CTAB, no respiratory distress, no wheezing, rales, rhonchi  CV: no reproducible chest wall tenderness, normal s1/s2, rrr, no murmurs, Normal perfusion, pulses 2+ throughout  Abd: soft, NTND  MSK: No edema, no visible deformities, full range of motion in all 4 extremities  Neuro: No focal neurologic deficits  Skin: No rash   Psych: normal affect
Gen: NAD, AOx3  Head: NCAT  HEENT: PERRL, oral mucosa moist, normal conjunctiva, oropharynx clear without exudate or erythema  Lung: CTAB, no respiratory distress, no wheezing, rales, rhonchi  CV: no reproducible chest wall tenderness, normal s1/s2, rrr, no murmurs, Normal perfusion, pulses 2+ throughout  Abd: soft, NTND  MSK: No edema, no visible deformities, full range of motion in all 4 extremities  Neuro: No focal neurologic deficits  Skin: No rash   Psych: normal affect
BMI:   HbA1c: A1C with Estimated Average Glucose Result: 5.4 % (09-04-22 @ 08:40)    Glucose:   BP: 149/89 (09-07-22 @ 08:27) (141/91 - 149/103)  Lipid Panel: Date/Time: 09-04-22 @ 08:40  Cholesterol, Serum: 151  Direct LDL: --  HDL Cholesterol, Serum: 77  Total Cholesterol/HDL Ration Measurement: --  Triglycerides, Serum: 59

## 2022-09-12 ENCOUNTER — TRANSCRIPTION ENCOUNTER (OUTPATIENT)
Age: 52
End: 2022-09-12

## 2022-09-12 LAB
ANION GAP SERPL CALC-SCNC: 9 MMOL/L
BASOPHILS # BLD AUTO: 0.04 K/UL
BASOPHILS NFR BLD AUTO: 0.6 %
BUN SERPL-MCNC: 12 MG/DL
CALCIUM SERPL-MCNC: 9.2 MG/DL
CHLORIDE SERPL-SCNC: 102 MMOL/L
CO2 SERPL-SCNC: 27 MMOL/L
CREAT SERPL-MCNC: 1.06 MG/DL
EGFR: 84 ML/MIN/1.73M2
EOSINOPHIL # BLD AUTO: 0.15 K/UL
EOSINOPHIL NFR BLD AUTO: 2.4 %
GLUCOSE SERPL-MCNC: 75 MG/DL
HCT VFR BLD CALC: 41.8 %
HGB BLD-MCNC: 14.5 G/DL
IMM GRANULOCYTES NFR BLD AUTO: 0.3 %
INR PPP: 1.08 RATIO
LYMPHOCYTES # BLD AUTO: 2.06 K/UL
LYMPHOCYTES NFR BLD AUTO: 33.1 %
MAN DIFF?: NORMAL
MCHC RBC-ENTMCNC: 33.6 PG
MCHC RBC-ENTMCNC: 34.7 GM/DL
MCV RBC AUTO: 96.8 FL
MONOCYTES # BLD AUTO: 0.35 K/UL
MONOCYTES NFR BLD AUTO: 5.6 %
NEUTROPHILS # BLD AUTO: 3.6 K/UL
NEUTROPHILS NFR BLD AUTO: 58 %
PLATELET # BLD AUTO: 183 K/UL
POTASSIUM SERPL-SCNC: 4.2 MMOL/L
PT BLD: 12.5 SEC
RBC # BLD: 4.32 M/UL
RBC # FLD: 11.9 %
SARS-COV-2 N GENE NPH QL NAA+PROBE: NOT DETECTED
SODIUM SERPL-SCNC: 139 MMOL/L
WBC # FLD AUTO: 6.22 K/UL

## 2022-09-13 ENCOUNTER — RESULT REVIEW (OUTPATIENT)
Age: 52
End: 2022-09-13

## 2022-09-13 ENCOUNTER — APPOINTMENT (OUTPATIENT)
Dept: GASTROENTEROLOGY | Facility: GI CENTER | Age: 52
End: 2022-09-13

## 2022-09-13 ENCOUNTER — OUTPATIENT (OUTPATIENT)
Dept: OUTPATIENT SERVICES | Facility: HOSPITAL | Age: 52
LOS: 1 days | End: 2022-09-13
Payer: COMMERCIAL

## 2022-09-13 DIAGNOSIS — Z12.11 ENCOUNTER FOR SCREENING FOR MALIGNANT NEOPLASM OF COLON: ICD-10-CM

## 2022-09-13 PROCEDURE — 45385 COLONOSCOPY W/LESION REMOVAL: CPT | Mod: 33

## 2022-09-13 PROCEDURE — 45380 COLONOSCOPY AND BIOPSY: CPT | Mod: PT

## 2022-09-13 PROCEDURE — 88305 TISSUE EXAM BY PATHOLOGIST: CPT

## 2022-09-13 PROCEDURE — 88305 TISSUE EXAM BY PATHOLOGIST: CPT | Mod: 26

## 2022-09-13 NOTE — PHYSICAL EXAM
[Normal Appearance] : the appearance of the neck was normal [Abnormal Walk] : normal gait [Involuntary Movements] : no involuntary movements were seen [Motor Tone] : muscle strength and tone were normal [Normal Color / Pigmentation] : normal skin color and pigmentation [] : no rash [No Focal Deficits] : no focal deficits [Motor Exam] : the motor exam was normal [Normal] : oriented to person, place, and time

## 2022-09-15 LAB — SURGICAL PATHOLOGY STUDY: SIGNIFICANT CHANGE UP

## 2023-01-10 NOTE — ED ADULT NURSE NOTE - MODE OF DISCHARGE
Refill passed per [facility] [place] protocol. Requested Prescriptions   Pending Prescriptions Disp Refills    hydroCHLOROthiazide 12.5 MG Oral Cap 90 capsule 1     Sig: Take 1 capsule (12.5 mg total) by mouth daily.        Hypertensive Medications Protocol Passed - 1/9/2023 12:34 PM        Passed - In person appointment in the past 12 or next 3 months     Recent Outpatient Visits              2 months ago Elevated PSA    [facility], [address], Lucho Nelson MD    Office Visit    4 months ago Gastroesophageal reflux disease without esophagitis    Jade Alexander MD    Office Visit    7 months ago Insulin resistance    Huy Bedolla MD    Office Visit    9 months ago HEYDI on CPAP    Jade Pace MD    Office Visit    1 year ago Insulin resistance    [address] [facility] [address], Ward Douglas MD    Office Visit                      Passed - Last BP reading less than 140/90     BP Readings from Last 1 Encounters:  08/25/22 : 112/72              Passed - CMP or BMP in past 6 months     Recent Results (from the past 4392 hour(s))   COMP METABOLIC PANEL (14)    Collection Time: 09/06/22  9:08 AM   Result Value Ref Range    Glucose 104 (H) 70 - 99 mg/dL    Sodium 138 136 - 145 mmol/L    Potassium 3.6 3.5 - 5.1 mmol/L    Chloride 103 98 - 112 mmol/L    CO2 28.0 21.0 - 32.0 mmol/L    Anion Gap 7 0 - 18 mmol/L    BUN 21 (H) 7 - 18 mg/dL    Creatinine 1.32 (H) 0.70 - 1.30 mg/dL    BUN/CREA Ratio 15.9 10.0 - 20.0    Calcium, Total 8.9 8.5 - 10.1 mg/dL    Calculated Osmolality 289 275 - 295 mOsm/kg    eGFR-Cr 57 (L) >=60 mL/min/1.73m2    ALT 26 16 - 61 U/L    AST 18 15 - 37 U/L    Alkaline Phosphatase 69 45 - 117 U/L    Bilirubin, Total 0.8 0.1 - 2.0 mg/dL    Total Protein 7.2 6.4 - 8.2 g/dL    Albumin 3.5 3.4 - 5.0 g/dL Globulin  3.7 2.8 - 4.4 g/dL    A/G Ratio 0.9 (L) 1.0 - 2.0    Patient Fasting for CMP? Yes      *Note: Due to a large number of results and/or encounters for the requested time period, some results have not been displayed. A complete set of results can be found in Results Review.                Passed - In person appointment or virtual visit in the past 6 months     Recent Outpatient Visits              2 months ago Elevated PSA [facility], [address], Anthony Box MD    Office Visit    4 months ago Gastroesophageal reflux disease without esophagitis    Saima Reyes MD    Office Visit    7 months ago Insulin resistance    [address] [facility], Darwin Espinoza MD    Office Visit    9 months ago HEYDI on CPAP    Jaspal Ray MD    Office Visit    1 year ago Insulin resistance    Anthony Da Silva MD    Office Visit                      Passed - [facility] or GFRNAA > 50     GFR Evaluation  EGFRCR: 57 , resulted on 9/6/2022                     Recent Outpatient Visits              2 months ago Elevated PSA [facility] [address], Anthony Box MD    Office Visit    4 months ago Gastroesophageal reflux disease without esophagitis    Jaspal Fernandez MD    Office Visit    7 months ago Insulin resistance    Anthony Da Silva MD    Office Visit    9 months ago HEYDI on CPAP    Jaspal Ray MD    Office Visit    1 year ago Insulin resistance    [address] [facility], Darwin Espinoza MD    Office Visit Ambulatory

## 2023-04-06 ENCOUNTER — APPOINTMENT (OUTPATIENT)
Dept: CARDIOLOGY | Facility: CLINIC | Age: 53
End: 2023-04-06
